# Patient Record
Sex: FEMALE | Race: WHITE | HISPANIC OR LATINO | Employment: FULL TIME | URBAN - METROPOLITAN AREA
[De-identification: names, ages, dates, MRNs, and addresses within clinical notes are randomized per-mention and may not be internally consistent; named-entity substitution may affect disease eponyms.]

---

## 2021-05-08 ENCOUNTER — OFFICE VISIT (OUTPATIENT)
Dept: URGENT CARE | Facility: CLINIC | Age: 36
End: 2021-05-08
Payer: COMMERCIAL

## 2021-05-08 ENCOUNTER — APPOINTMENT (OUTPATIENT)
Dept: RADIOLOGY | Facility: IMAGING CENTER | Age: 36
End: 2021-05-08
Attending: NURSE PRACTITIONER
Payer: COMMERCIAL

## 2021-05-08 VITALS
OXYGEN SATURATION: 97 % | RESPIRATION RATE: 16 BRPM | WEIGHT: 150 LBS | DIASTOLIC BLOOD PRESSURE: 78 MMHG | TEMPERATURE: 98.2 F | HEIGHT: 64 IN | HEART RATE: 100 BPM | BODY MASS INDEX: 25.61 KG/M2 | SYSTOLIC BLOOD PRESSURE: 110 MMHG

## 2021-05-08 DIAGNOSIS — S99.911A INJURY OF RIGHT ANKLE, INITIAL ENCOUNTER: ICD-10-CM

## 2021-05-08 DIAGNOSIS — S82.891A CLOSED FRACTURE OF RIGHT ANKLE, INITIAL ENCOUNTER: ICD-10-CM

## 2021-05-08 PROBLEM — R87.810 CERVICAL HIGH RISK HUMAN PAPILLOMAVIRUS (HPV) DNA TEST POSITIVE: Status: ACTIVE | Noted: 2019-03-20

## 2021-05-08 PROBLEM — L70.9 ACNE: Status: ACTIVE | Noted: 2018-10-04

## 2021-05-08 PROBLEM — Z97.5 PRESENCE OF IUD: Status: ACTIVE | Noted: 2018-10-01

## 2021-05-08 PROBLEM — F43.25 ADJUSTMENT DISORDER WITH MIXED DISTURBANCE OF EMOTIONS AND CONDUCT: Status: ACTIVE | Noted: 2018-10-18

## 2021-05-08 PROBLEM — F41.8 MIXED ANXIETY DEPRESSIVE DISORDER: Status: ACTIVE | Noted: 2018-10-18

## 2021-05-08 PROBLEM — Z56.9: Status: ACTIVE | Noted: 2018-10-23

## 2021-05-08 PROCEDURE — 99204 OFFICE O/P NEW MOD 45 MIN: CPT | Performed by: NURSE PRACTITIONER

## 2021-05-08 PROCEDURE — 73610 X-RAY EXAM OF ANKLE: CPT | Mod: TC,RT | Performed by: NURSE PRACTITIONER

## 2021-05-08 RX ORDER — ESCITALOPRAM OXALATE 10 MG/1
TABLET ORAL
COMMUNITY
Start: 2020-08-25 | End: 2022-08-25

## 2021-05-08 ASSESSMENT — ENCOUNTER SYMPTOMS
TINGLING: 0
CHILLS: 0
LOSS OF MOTION: 0
EYE REDNESS: 0
INABILITY TO BEAR WEIGHT: 1
DIZZINESS: 0
VOMITING: 0
SORE THROAT: 0
FEVER: 0
SHORTNESS OF BREATH: 0
MYALGIAS: 0
LOSS OF SENSATION: 0
MUSCLE WEAKNESS: 0
NAUSEA: 0
NUMBNESS: 0

## 2021-05-09 NOTE — PROGRESS NOTES
"Subjective:   Alicia Cardenas is a 36 y.o. female who presents for Ankle Injury (right x today)      Ankle Injury   The incident occurred less than 1 hour ago. Incident location: at [facility] in town. The injury mechanism was an inversion injury. The pain is present in the right ankle. The quality of the pain is described as aching. The pain is at a severity of 9/10. The pain is moderate. The pain has been constant since onset. Associated symptoms include an inability to bear weight. Pertinent negatives include no loss of motion, loss of sensation, muscle weakness, numbness or tingling. She reports no foreign bodies present. The symptoms are aggravated by movement, palpation and weight bearing. She has tried nothing for the symptoms. The treatment provided no relief.       Review of Systems   Constitutional: Negative for chills and fever.   HENT: Negative for sore throat.    Eyes: Negative for redness.   Respiratory: Negative for shortness of breath.    Cardiovascular: Negative for chest pain.   Gastrointestinal: Negative for nausea and vomiting.   Genitourinary: Negative for dysuria.   Musculoskeletal: Positive for joint pain. Negative for myalgias.   Skin: Negative for rash.   Neurological: Negative for dizziness, tingling and numbness.       Medications:    • This patient does not have an active medication from one of the medication groupers.    Allergies: Patient has no known allergies.    Problem List: Alicia Cardenas does not have a problem list on file.    Surgical History:  No past surgical history on file.    Past Social Hx: Alicia Cardenas       Past Family Hx:  Alicia Cardenas family history is not on file.     Problem list, medications, and allergies reviewed by myself today in Epic.     Objective:     /78 (BP Location: Left arm, Patient Position: Sitting, BP Cuff Size: Adult)   Pulse 100   Temp 36.8 °C (98.2 °F) (Temporal)   Resp 16   Ht 1.626 m (5' 4\")   Wt 68 kg (150 lb)   SpO2 " 97%   BMI 25.75 kg/m²     Physical Exam  Vitals and nursing note reviewed.   Constitutional:       General: She is not in acute distress.     Appearance: She is well-developed.   HENT:      Head: Normocephalic and atraumatic.      Right Ear: External ear normal.      Left Ear: External ear normal.      Nose: Nose normal.      Mouth/Throat:      Mouth: Mucous membranes are moist.   Eyes:      Conjunctiva/sclera: Conjunctivae normal.   Cardiovascular:      Rate and Rhythm: Normal rate.   Pulmonary:      Effort: Pulmonary effort is normal. No respiratory distress.      Breath sounds: Normal breath sounds.   Abdominal:      General: There is no distension.   Musculoskeletal:      Right ankle: Swelling present. No deformity, ecchymosis or lacerations. Tenderness present over the lateral malleolus and medial malleolus. No base of 5th metatarsal or proximal fibula tenderness. Decreased range of motion.      Right Achilles Tendon: Normal.   Skin:     General: Skin is warm and dry.   Neurological:      General: No focal deficit present.      Mental Status: She is alert and oriented to person, place, and time. Mental status is at baseline.      Gait: Gait (gait at baseline) normal.   Psychiatric:         Judgment: Judgment normal.         Assessment/Plan:     Diagnosis and associated orders:     1. Injury of right ankle, initial encounter  DX-ANKLE 3+ VIEWS RIGHT    REFERRAL TO ORTHOPEDICS   2. Closed fracture of right ankle, initial encounter  REFERRAL TO ORTHOPEDICS      Comments/MDM:     I independently reviewed the patient's imaging and agree with the interpretation of the radiologist.       Questionable lucency in the tip of the fibula could relate to small avulsion fracture.  Soft tissue swelling about the lateral malleolus    Patient is a 36-year-old female present with the stated above, on x-ray concerning for small avulsion fracture which would be consistent with injury and physical exam.  Patient will be placed in  a walking boot and provided crutches.  Advised weightbearing as tolerated.  Encouraged to rest, ice, elevate when at rest.  May take Tylenol ibuprofen as needed for pain.  Will place referral to orthopedics for follow-up for further evaluation and management.  Patient will follow-up when she returns home   to [facility] later this week. Differential diagnosis, natural history, supportive care, and indications for immediate follow-up discussed.          Please note that this dictation was created using voice recognition software. I have made a reasonable attempt to correct obvious errors, but I expect that there are errors of grammar and possibly content that I did not discover before finalizing the note.    This note was electronically signed by Aman OLIVEIRA.